# Patient Record
Sex: MALE | Race: WHITE
[De-identification: names, ages, dates, MRNs, and addresses within clinical notes are randomized per-mention and may not be internally consistent; named-entity substitution may affect disease eponyms.]

---

## 2019-12-03 ENCOUNTER — HOSPITAL ENCOUNTER (INPATIENT)
Dept: HOSPITAL 84 - D.ER | Age: 64
LOS: 1 days | Discharge: HOME | DRG: 552 | End: 2019-12-04
Attending: INTERNAL MEDICINE | Admitting: INTERNAL MEDICINE
Payer: MEDICARE

## 2019-12-03 VITALS — WEIGHT: 145 LBS | BODY MASS INDEX: 21.98 KG/M2 | BODY MASS INDEX: 21.98 KG/M2 | HEIGHT: 68 IN

## 2019-12-03 VITALS — SYSTOLIC BLOOD PRESSURE: 117 MMHG | DIASTOLIC BLOOD PRESSURE: 82 MMHG

## 2019-12-03 VITALS — SYSTOLIC BLOOD PRESSURE: 127 MMHG | DIASTOLIC BLOOD PRESSURE: 71 MMHG

## 2019-12-03 VITALS — SYSTOLIC BLOOD PRESSURE: 119 MMHG | DIASTOLIC BLOOD PRESSURE: 70 MMHG

## 2019-12-03 DIAGNOSIS — I71.4: ICD-10-CM

## 2019-12-03 DIAGNOSIS — I73.9: ICD-10-CM

## 2019-12-03 DIAGNOSIS — R91.1: ICD-10-CM

## 2019-12-03 DIAGNOSIS — R06.00: ICD-10-CM

## 2019-12-03 DIAGNOSIS — G35: ICD-10-CM

## 2019-12-03 DIAGNOSIS — R55: ICD-10-CM

## 2019-12-03 DIAGNOSIS — R59.0: ICD-10-CM

## 2019-12-03 DIAGNOSIS — I25.10: ICD-10-CM

## 2019-12-03 DIAGNOSIS — F17.210: ICD-10-CM

## 2019-12-03 DIAGNOSIS — R41.82: ICD-10-CM

## 2019-12-03 DIAGNOSIS — J43.9: ICD-10-CM

## 2019-12-03 DIAGNOSIS — M19.90: ICD-10-CM

## 2019-12-03 DIAGNOSIS — R06.01: ICD-10-CM
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
## 2019-12-03 DIAGNOSIS — S32.019A: Primary | ICD-10-CM

## 2019-12-03 LAB
ALBUMIN SERPL-MCNC: 4 G/DL (ref 3.4–5)
ALP SERPL-CCNC: 45 U/L (ref 46–116)
ALT SERPL-CCNC: 28 U/L (ref 10–68)
ANION GAP SERPL CALC-SCNC: 14.3 MMOL/L (ref 8–16)
APPEARANCE UR: CLEAR
APTT BLD: 28.2 SECONDS (ref 22.8–39.4)
BASOPHILS NFR BLD AUTO: 0.1 % (ref 0–2)
BILIRUB SERPL-MCNC: 0.54 MG/DL (ref 0.2–1.3)
BILIRUB SERPL-MCNC: NEGATIVE MG/DL
BUN SERPL-MCNC: 17 MG/DL (ref 7–18)
CALCIUM SERPL-MCNC: 9.7 MG/DL (ref 8.5–10.1)
CHLORIDE SERPL-SCNC: 101 MMOL/L (ref 98–107)
CHOLEST/HDLC SERPL: 4.9 RATIO (ref 2.3–4.9)
CK MB SERPL-MCNC: 0.8 U/L (ref 0–3.6)
CK MB SERPL-MCNC: 0.9 U/L (ref 0–3.6)
CK SERPL-CCNC: 206 UL (ref 21–232)
CK SERPL-CCNC: 224 UL (ref 21–232)
CO2 SERPL-SCNC: 25.8 MMOL/L (ref 21–32)
COLOR UR: (no result)
CREAT SERPL-MCNC: 0.8 MG/DL (ref 0.6–1.3)
EOSINOPHIL NFR BLD: 0.1 % (ref 0–7)
ERYTHROCYTE [DISTWIDTH] IN BLOOD BY AUTOMATED COUNT: 13 % (ref 11.5–14.5)
GLOBULIN SER-MCNC: 4.7 G/L
GLUCOSE SERPL-MCNC: 112 MG/DL (ref 74–106)
GLUCOSE SERPL-MCNC: NEGATIVE MG/DL
HCT VFR BLD CALC: 48.3 % (ref 42–54)
HDLC SERPL-MCNC: 38 MG/DL (ref 32–96)
HGB BLD-MCNC: 16.7 G/DL (ref 13.5–17.5)
IMM GRANULOCYTES NFR BLD: 0.3 % (ref 0–5)
INR PPP: 1.09 (ref 0.85–1.17)
KETONES UR STRIP-MCNC: NEGATIVE MG/DL
LDL-HDL RATIO: 3.6 RATIO (ref 1.5–3.5)
LDLC SERPL-MCNC: 136 MG/DL (ref 0–100)
LYMPHOCYTES NFR BLD AUTO: 9 % (ref 15–50)
MCH RBC QN AUTO: 31.8 PG (ref 26–34)
MCHC RBC AUTO-ENTMCNC: 34.6 G/DL (ref 31–37)
MCV RBC: 92 FL (ref 80–100)
MONOCYTES NFR BLD: 3.9 % (ref 2–11)
NEUTROPHILS NFR BLD AUTO: 86.6 % (ref 40–80)
NITRITE UR-MCNC: NEGATIVE MG/ML
NT-PROBNP SERPL-MCNC: 122 PG/ML (ref 0–125)
OSMOLALITY SERPL CALC.SUM OF ELEC: 276 MOSM/KG (ref 275–300)
PH UR STRIP: 6 [PH] (ref 5–6)
PLATELET # BLD: 198 10X3/UL (ref 130–400)
PMV BLD AUTO: 9.9 FL (ref 7.4–10.4)
POTASSIUM SERPL-SCNC: 4.1 MMOL/L (ref 3.5–5.1)
PROT SERPL-MCNC: 8.7 G/DL (ref 6.4–8.2)
PROT UR-MCNC: NEGATIVE MG/DL
PROTHROMBIN TIME: 13.6 SECONDS (ref 11.6–15)
RBC # BLD AUTO: 5.25 10X6/UL (ref 4.2–6.1)
SODIUM SERPL-SCNC: 137 MMOL/L (ref 136–145)
SP GR UR STRIP: 1.01 (ref 1–1.02)
TRIGL SERPL-MCNC: 60 MG/DL (ref 30–200)
TROPONIN I SERPL-MCNC: < 0.017 NG/ML (ref 0–0.06)
TROPONIN I SERPL-MCNC: < 0.017 NG/ML (ref 0–0.06)
UROBILINOGEN UR-MCNC: NORMAL MG/DL
WBC # BLD AUTO: 11.8 10X3/UL (ref 4.8–10.8)

## 2019-12-03 NOTE — MORECARE
CASE MANAGEMENT DISCHARGE SUMMARY
 
 
PATIENT: MIKKI MEHTA                   UNIT: [id number]
ACCOUNT#: [id number]                       ADM DATE: 19
AGE: 64     : 53  SEX: M            ROOM/BED: D.2229    
AUTHOR: DEANDRA,DOC                             PHYSICIAN:                               
 
REFERRING PHYSICIAN: DIANA SPENCE MD               
DATE OF SERVICE: 19
Discharge Plan
 
 
Patient Name: MIKKI MEHTA
Facility: [facility]:[place]
Encounter #: [id number]
Medical Record #: [id number]
: 1955
Planned Disposition: Home
Anticipated Discharge Date: 19
 
Discharge Date: 
Expected LOS: 2
Initial Reviewer: [id number]
Initial Review Date: 2019
Generated: 12/3/19   5:03 pm 
Comments
 
DCP- Discharge Planning
 
Updated by [email or web address]: Charu Oviedo on 12/3/19   2:50 pm CT
DC PLAN: To his mother's house whom he cares for 4 nights a week.  
ANTICIPATED DC NEEDS: Denied known dc needs.  
 
CM met with patient to complete initial dc planning assessment.  CM educated 
patient on the CM role and verbal consent given by patient to complete 
assessment.  CM verified patient's address, phone number, and emergency 
contact phone numbers.  Patient lives at home with his wife.  He reports he 
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 
that stays four nights.   At discharge patient plans to return to his mothers 
since his young grandsons stay with him and feels this is a safe discharge.  
CM discussed availability of home health, rehab services, and medical 
equipment. Patient denied known discharge needs at this time. Transportation 
provider at discharge will be either his wife or his con. CM will continue to 
follow and will assist as needed with dc plans/needs.   
 
Charu Oviedo RN, [facility]
 DCPIA - Discharge Planning Initial Assessment
 
Updated by [email or web address]: Charu Oviedo on 12/3/19   3:39 pm
 
*  Is the patient Alert and Oriented?
Yes
*  PCP
Dr. Starr
*  Pharmacy
[facility]
*  Preadmission Environment
Home with Family
*  ADLs
Independent
*  Equipment
None
*  List name and contact numbers for known caregivers / representatives who 
currently or will assist patient after discharge:
Georgette Perea - daughter - [phone]  Brianda Mehta - spouse - [phone]
 
*  Verbal permission to speak to the caregivers and representatives has been 
obtained from the patient.
Yes
*  Community resources currently utilized
None
*  Additional services required to return to the preadmission environment?
No
*  Can the patient safely return to the preadmission environment?
Yes
*  Has this patient been hospitalized within the prior 30 days at any 
hospital?
No
 
 
 
 
 
 
 
Last DP export: 12/3/19   2:41 p
Patient Name: MIKKI MEHTA
 
Encounter #: [id number]
Page [id number]
 
 
 
 
 
Electronically Signed by ARVIN LIRIANO on 19 at 1603
 
 
 
 
 
 
**All edits/amendments must be made on the electronic document**
 
DICTATION DATE: 19 1603     : LOREN  19 1603     
RPT#: [id number]                                DC DATE:        
                                               STATUS: ADM IN  
[facility]
 [place] [address]
***END OF REPORT***

## 2019-12-03 NOTE — MORECARE
CASE MANAGEMENT DISCHARGE SUMMARY
 
 
PATIENT: MIKKI MEHTA                   UNIT: [id number]
ACCOUNT#: [id number]                       ADM DATE: 19
AGE: 64     : 53  SEX: M            ROOM/BED: D.2229    
AUTHOR: ARVIN LIRIANO                             PHYSICIAN:                               
 
REFERRING PHYSICIAN: DIANA SPENCE MD               
DATE OF SERVICE: 19
Discharge Plan
 
 
Patient Name: MIKKI MEHTA
[facility]
Encounter #: [id number]
Medical Record #: [id number]
: 1955
Planned Disposition: Home
Anticipated Discharge Date: 19
 
Discharge Date: 
Expected LOS: 2
Initial Reviewer: [id number]
Initial Review Date: 2019
Generated: 12/3/19   4:41 pm 
 DCPIA - Discharge Planning Initial Assessment
 
Updated by [email or web address]: Charu Oviedo on 12/3/19   3:39 pm
*  Is the patient Alert and Oriented?
Yes
*  PCP
Dr. Starr
*  Pharmacy
[facility]
*  Preadmission Environment
Home with Family
*  ADLs
Independent
*  Equipment
None
*  List name and contact numbers for known caregivers / representatives who 
currently or will assist patient after discharge:
Georgette Perea - daughter - [phone]  Brianda Mehta - spouse - [phone]
 
*  Verbal permission to speak to the caregivers and representatives has been 
obtained from the patient.
Yes
*  Community resources currently utilized
None
 
*  Additional services required to return to the preadmission environment?
No
*  Can the patient safely return to the preadmission environment?
Yes
*  Has this patient been hospitalized within the prior 30 days at any 
hospital?
No
 
 
 
 
 
 
Patient Name: MIKKI MEHTA
Encounter #: [id number]
Page [id number]
 
 
 
 
 
Electronically Signed by ARVIN LIRIANO on 19 at 1541
 
 
 
 
 
 
**All edits/amendments must be made on the electronic document**
 
DICTATION DATE: 19 1540     : LOREN  19 1540     
RPT#: [id number]                                DC DATE:        
                                               STATUS: ADM IN  
[facility]
 [place] [address]
***END OF REPORT***

## 2019-12-03 NOTE — NUR
IN BED WITH FAMILY AT SIDE, ABLE TO VOICE ALL NEEDS. COMPLAINS OF PAIN TO
LUMBAR AREA, WILL TREAT WITH MEDICATION. SHOWS NO S/S OF ANY OTHER ACUTE
DISTRESS.

## 2019-12-04 VITALS — DIASTOLIC BLOOD PRESSURE: 78 MMHG | SYSTOLIC BLOOD PRESSURE: 130 MMHG

## 2019-12-04 VITALS — SYSTOLIC BLOOD PRESSURE: 120 MMHG | DIASTOLIC BLOOD PRESSURE: 72 MMHG

## 2019-12-04 VITALS — DIASTOLIC BLOOD PRESSURE: 72 MMHG | SYSTOLIC BLOOD PRESSURE: 109 MMHG

## 2019-12-04 VITALS — DIASTOLIC BLOOD PRESSURE: 68 MMHG | SYSTOLIC BLOOD PRESSURE: 120 MMHG

## 2019-12-04 LAB
ALBUMIN SERPL-MCNC: 3.3 G/DL (ref 3.4–5)
ALP SERPL-CCNC: 39 U/L (ref 46–116)
ALT SERPL-CCNC: 22 U/L (ref 10–68)
ANION GAP SERPL CALC-SCNC: 9.3 MMOL/L (ref 8–16)
BASOPHILS NFR BLD AUTO: 0.1 % (ref 0–2)
BILIRUB SERPL-MCNC: 0.61 MG/DL (ref 0.2–1.3)
BUN SERPL-MCNC: 13 MG/DL (ref 7–18)
CALCIUM SERPL-MCNC: 8.7 MG/DL (ref 8.5–10.1)
CHLORIDE SERPL-SCNC: 102 MMOL/L (ref 98–107)
CK MB SERPL-MCNC: 0.7 U/L (ref 0–3.6)
CK SERPL-CCNC: 197 UL (ref 21–232)
CO2 SERPL-SCNC: 29.6 MMOL/L (ref 21–32)
CREAT SERPL-MCNC: 0.7 MG/DL (ref 0.6–1.3)
EOSINOPHIL NFR BLD: 1.7 % (ref 0–7)
ERYTHROCYTE [DISTWIDTH] IN BLOOD BY AUTOMATED COUNT: 13 % (ref 11.5–14.5)
GLOBULIN SER-MCNC: 4.2 G/L
GLUCOSE SERPL-MCNC: 112 MG/DL (ref 74–106)
HCT VFR BLD CALC: 45.7 % (ref 42–54)
HGB BLD-MCNC: 15.6 G/DL (ref 13.5–17.5)
IMM GRANULOCYTES NFR BLD: 0.3 % (ref 0–5)
LYMPHOCYTES NFR BLD AUTO: 22.2 % (ref 15–50)
MCH RBC QN AUTO: 31.6 PG (ref 26–34)
MCHC RBC AUTO-ENTMCNC: 34.1 G/DL (ref 31–37)
MCV RBC: 92.5 FL (ref 80–100)
MONOCYTES NFR BLD: 7.1 % (ref 2–11)
NEUTROPHILS NFR BLD AUTO: 68.6 % (ref 40–80)
OSMOLALITY SERPL CALC.SUM OF ELEC: 274 MOSM/KG (ref 275–300)
PLATELET # BLD: 157 10X3/UL (ref 130–400)
PMV BLD AUTO: 9.7 FL (ref 7.4–10.4)
POTASSIUM SERPL-SCNC: 3.9 MMOL/L (ref 3.5–5.1)
PROT SERPL-MCNC: 7.5 G/DL (ref 6.4–8.2)
RBC # BLD AUTO: 4.94 10X6/UL (ref 4.2–6.1)
SODIUM SERPL-SCNC: 137 MMOL/L (ref 136–145)
TROPONIN I SERPL-MCNC: < 0.017 NG/ML (ref 0–0.06)
WBC # BLD AUTO: 8.7 10X3/UL (ref 4.8–10.8)

## 2019-12-08 NOTE — MORECARE
CASE MANAGEMENT DISCHARGE SUMMARY
 
 
PATIENT: MIKKI MEHTA                   UNIT: [id number]
ACCOUNT#: [id number]                       ADM DATE: 19
AGE: 64     : 53  SEX: M            ROOM/BED: D.2229    
AUTHOR: DEANDRA,DOC                             PHYSICIAN:                               
 
REFERRING PHYSICIAN: DIANA SPENCE MD               
DATE OF SERVICE: 19
Discharge Plan
 
 
Patient Name: MIKKI MEHTA
Facility: [facility]:[place]
Encounter #: [id number]
Medical Record #: [id number]
: 1955
Planned Disposition: Home
Anticipated Discharge Date: 19
 
Discharge Date: 2019
Expected LOS: 2
Initial Reviewer: [id number]
Initial Review Date: 2019
Generated: 19   5:22 pm 
DCP- Discharge Planning
 
Updated by [email or web address]: Charu Oviedo on 12/3/19   2:50 pm CT
DC PLAN: To his mother's house whom he cares for 4 nights a week.  
ANTICIPATED DC NEEDS: Denied known dc needs.  
 
CM met with patient to complete initial dc planning assessment.  CM educated 
patient on the CM role and verbal consent given by patient to complete 
assessment.  CM verified patient's address, phone number, and emergency 
contact phone numbers.  Patient lives at home with his wife.  He reports he 
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 
that stays four nights.   At discharge patient plans to return to his mothers 
since his young grandsons stay with him and feels this is a safe discharge.  
CM discussed availability of home health, rehab services, and medical 
equipment. Patient denied known discharge needs at this time. Transportation 
provider at discharge will be either his wife or his con. CM will continue to 
follow and will assist as needed with dc plans/needs.   
 
Charu Oviedo RN, [facility]
 DCPIA - Discharge Planning Initial Assessment
 
Updated by [email or web address]: Charu Oviedo on 12/3/19   3:39 pm
*  Is the patient Alert and Oriented?
Yes
 
*  PCP
Dr. Starr
*  Pharmacy
[facility]
*  Preadmission Environment
Home with Family
*  ADLs
Independent
*  Equipment
None
*  List name and contact numbers for known caregivers / representatives who 
currently or will assist patient after discharge:
Georgette Perea - daughter - [phone]  Brianda Mehta - spouse - [phone]
 
*  Verbal permission to speak to the caregivers and representatives has been 
obtained from the patient.
Yes
*  Community resources currently utilized
None
*  Additional services required to return to the preadmission environment?
No
*  Can the patient safely return to the preadmission environment?
Yes
*  Has this patient been hospitalized within the prior 30 days at any 
hospital?
No
 
 
 
 
 
 
 
Last DP export: 12/3/19   3:03 p
Patient Name: MIKKI MEHTA
 
Encounter #: [id number]
Page [id number]
 
 
 
 
 
Electronically Signed by ARVIN LIRIANO on 19 at 1622
 
 
 
 
 
 
**All edits/amendments must be made on the electronic document**
 
DICTATION DATE: 19     : LOREN  19     
RPT#: [id number]                                DC DATE:19
                                               STATUS: DIS IN  
[facility]
 [place] [address]
***END OF REPORT***

## 2020-01-09 ENCOUNTER — HOSPITAL ENCOUNTER (OUTPATIENT)
Dept: HOSPITAL 84 - D.HCCARDIO | Age: 65
Discharge: HOME | End: 2020-01-09
Attending: INTERNAL MEDICINE
Payer: MEDICARE

## 2020-01-09 VITALS — BODY MASS INDEX: 22 KG/M2

## 2020-01-09 DIAGNOSIS — I25.10: Primary | ICD-10-CM
